# Patient Record
Sex: FEMALE | Race: WHITE
[De-identification: names, ages, dates, MRNs, and addresses within clinical notes are randomized per-mention and may not be internally consistent; named-entity substitution may affect disease eponyms.]

---

## 2018-07-31 ENCOUNTER — HOSPITAL ENCOUNTER (EMERGENCY)
Dept: HOSPITAL 45 - C.EDB | Age: 63
Discharge: HOME | End: 2018-07-31
Payer: COMMERCIAL

## 2018-07-31 ENCOUNTER — HOSPITAL ENCOUNTER (EMERGENCY)
Dept: HOSPITAL 45 - C.EDB | Age: 63
LOS: 1 days | Discharge: HOME | End: 2018-08-01
Payer: COMMERCIAL

## 2018-07-31 VITALS
HEIGHT: 62.99 IN | BODY MASS INDEX: 26.91 KG/M2 | WEIGHT: 151.9 LBS | WEIGHT: 151.9 LBS | BODY MASS INDEX: 26.91 KG/M2 | HEIGHT: 62.99 IN

## 2018-07-31 VITALS
HEIGHT: 62.99 IN | HEIGHT: 62.99 IN | WEIGHT: 152.34 LBS | WEIGHT: 152.34 LBS | BODY MASS INDEX: 26.99 KG/M2 | BODY MASS INDEX: 26.99 KG/M2

## 2018-07-31 VITALS — HEART RATE: 96 BPM | OXYGEN SATURATION: 98 % | DIASTOLIC BLOOD PRESSURE: 64 MMHG | SYSTOLIC BLOOD PRESSURE: 119 MMHG

## 2018-07-31 VITALS — DIASTOLIC BLOOD PRESSURE: 89 MMHG | HEART RATE: 70 BPM | SYSTOLIC BLOOD PRESSURE: 136 MMHG | OXYGEN SATURATION: 100 %

## 2018-07-31 VITALS — TEMPERATURE: 97.52 F

## 2018-07-31 VITALS — TEMPERATURE: 97.34 F

## 2018-07-31 DIAGNOSIS — N20.1: Primary | ICD-10-CM

## 2018-07-31 DIAGNOSIS — Z79.899: ICD-10-CM

## 2018-07-31 DIAGNOSIS — Z87.442: ICD-10-CM

## 2018-07-31 DIAGNOSIS — R11.2: ICD-10-CM

## 2018-07-31 DIAGNOSIS — R20.0: ICD-10-CM

## 2018-07-31 DIAGNOSIS — Z88.8: ICD-10-CM

## 2018-07-31 DIAGNOSIS — N13.2: Primary | ICD-10-CM

## 2018-07-31 LAB
ALBUMIN SERPL-MCNC: 3.8 GM/DL (ref 3.4–5)
ALP SERPL-CCNC: 60 U/L (ref 45–117)
ALT SERPL-CCNC: 28 U/L (ref 12–78)
AST SERPL-CCNC: 23 U/L (ref 15–37)
BASOPHILS # BLD: 0.03 K/UL (ref 0–0.2)
BASOPHILS NFR BLD: 0.4 %
BUN SERPL-MCNC: 19 MG/DL (ref 7–18)
CALCIUM SERPL-MCNC: 9.5 MG/DL (ref 8.5–10.1)
CO2 SERPL-SCNC: 27 MMOL/L (ref 21–32)
CREAT SERPL-MCNC: 0.75 MG/DL (ref 0.6–1.2)
EOS ABS #: 0.06 K/UL (ref 0–0.5)
EOSINOPHIL NFR BLD AUTO: 314 K/UL (ref 130–400)
GLUCOSE SERPL-MCNC: 99 MG/DL (ref 70–99)
HCT VFR BLD CALC: 39.3 % (ref 37–47)
HGB BLD-MCNC: 12.9 G/DL (ref 12–16)
IG#: 0.01 K/UL (ref 0–0.02)
IMM GRANULOCYTES NFR BLD AUTO: 22.2 %
LIPASE: 105 U/L (ref 73–393)
LYMPHOCYTES # BLD: 1.74 K/UL (ref 1.2–3.4)
MCH RBC QN AUTO: 29.5 PG (ref 25–34)
MCHC RBC AUTO-ENTMCNC: 32.8 G/DL (ref 32–36)
MCV RBC AUTO: 89.7 FL (ref 80–100)
MONO ABS #: 0.36 K/UL (ref 0.11–0.59)
MONOCYTES NFR BLD: 4.6 %
NEUT ABS #: 5.64 K/UL (ref 1.4–6.5)
NEUTROPHILS # BLD AUTO: 0.8 %
NEUTROPHILS NFR BLD AUTO: 71.9 %
PMV BLD AUTO: 9.9 FL (ref 7.4–10.4)
POTASSIUM SERPL-SCNC: 4 MMOL/L (ref 3.5–5.1)
PROT SERPL-MCNC: 7.6 GM/DL (ref 6.4–8.2)
RED CELL DISTRIBUTION WIDTH CV: 13.3 % (ref 11.5–14.5)
RED CELL DISTRIBUTION WIDTH SD: 43.6 FL (ref 36.4–46.3)
SODIUM SERPL-SCNC: 137 MMOL/L (ref 136–145)
WBC # BLD AUTO: 7.84 K/UL (ref 4.8–10.8)

## 2018-07-31 NOTE — EMERGENCY ROOM VISIT NOTE
History


Report prepared by Faustino:  Ar Solis


Under the Supervision of:  Dr. Michael Deleon M.D.


First contact with patient:  21:11


Chief Complaint:  KIDNEY STONE


Stated Complaint:  PAIN (KIDNEY STONE),NAUSEA,VOMITING





History of Present Illness


The patient is a 62 year old female who presents to the Emergency Room with 

complaints of waxing/waning left flank pain that began at 1800. She rates her 

pain as an 6/10 in severity. The patient states that she developed left flank 

pain today that caused her to come to the ED this morning. Per the patient's CT 

report she was found to have a 3 mm distal left ureteral calculus. Her lab work 

was found to be normal and she was discharged with an Oxycodone prescription. 

The patient states that when she was discharged her pain was not as severe. She 

states that around 1800 she started to experience a 10/10 in severity pain in 

her left flank. The patient states she did not  her Oxycodone 

prescription at that time, which caused her to take Aspirin. She reports that 

after she picked up her medication, she began to vomit. The patient states that 

she has not been able to keep her medication down. The patient denies LOC, 

headache, fevers, chills, diaphoresis, visual changes, neck pain, chest pain, 

breathing difficulties, abdominal pain, back pain, melena, hematochezia, 

urinary symptoms, weakness, lymphadenopathy, rash, or other complaints. She 

notes she did have numbness in her left arm down into her left leg over the 

weekend. The patient states these symptoms are resolved.





   Source of History:  patient


   Onset:  1800


   Position:  other (left flank)


   Symptom Intensity:  6/10


   Timing:  waxes/wanes


   Modifying Factors (Relieving):  other (Oxycodone, Aspirin)


   Associated Symptoms:  + nausea, + vomiting, + numbness (resolved)





Review of Systems


See HPI for pertinent positives and negatives.  A total of ten systems were 

reviewed and were otherwise negative.





Past Medical & Surgical


Medical Problems:


(1) Breast cancer


(2) Kidney stones


(3) No active medical problems








Family History





Patient reports no known family medical history.





Social History


Smoking Status:  Never Smoker


Marital Status:  


Housing Status:  lives with significant other


Occupation Status:  employed





Current/Historical Medications


Scheduled


Anastrozole (Arimidex), 1 MG PO DAILY


Calcium Carbonate-Vitamin D (Calcium + D), 1 TAB PO DAILY


Cyanocobalamin (Vitamin B12), 1,000 MCG PO DAILY


Fish Oil (Sula-3), 1 CAP PO DAILY





Scheduled PRN


Oxycodone Immediate Rel Tab (Roxicodone Ir), 5 MG PO Q4H PRN for Severe Pain





Allergies


Coded Allergies:  


     Iodine (Verified  Allergy, Mild, Sneezing, 7/31/18)


     Povidone Iodine (Verified  Allergy, Mild, Urticaria, 7/31/18)





Physical Exam


Vital Signs











  Date Time  Temp Pulse Resp B/P (MAP) Pulse Ox O2 Delivery O2 Flow Rate FiO2


 


7/31/18 23:50  96 18 119/64 98 Room Air  


 


7/31/18 22:41  75 18 109/57 99 Nasal Cannula 2.0 


 


7/31/18 21:47  75 18 127/67 95 Room Air  


 


7/31/18 21:02 36.3 76 20 153/81 98 Room Air  











Physical Exam


GENERAL: Awake, alert, well-appearing, in mild distress


HENT: Normocephalic, atraumatic. Oropharynx unremarkable.


EYES: Normal conjunctiva. Sclera non-icteric.


NECK: Supple. No nuchal rigidity. FROM. No masses.


RESPIRATORY: Clear to auscultation. No wheezes. No rales.  Normal respiratory 

effort.


CARDIAC: Normal rate.  Normal rhythm. No murmurs.  No rubs. Extremities warm 

and well perfused. Pulses equal.  No JVD.


GI: Soft, non-distended. No tenderness to palpation. No rebound or guarding. No 

masses.


RECTAL: Deferred.


MUSCULOSKELETAL: Atraumatic. Chest examination reveals no tenderness. The back 

is symmetrical on inspection without obvious abnormality. There is left CVA 

tenderness to palpation. No joint edema. 


LOWER EXTREMITIES: Calves are equal size bilaterally and non-tender. No edema. 

No discoloration. 


NEURO: Normal sensorium. No sensory or motor deficits noted. 


SKIN: No rash or jaundice noted.





Medical Decision & Procedures


Medications Administered











 Medications


  (Trade)  Dose


 Ordered  Sig/Juan Jose


 Route  Start Time


 Stop Time Status Last Admin


Dose Admin


 


 Sodium Chloride  1,000 ml @ 


 999 mls/hr  Q1H1M STAT


 IV  7/31/18 21:34


 7/31/18 22:34 DC 7/31/18 21:40


999 MLS/HR


 


 Ondansetron HCl


  (Zofran Inj)  4 mg  NOW  STAT


 IV  7/31/18 21:34


 7/31/18 21:37 DC 7/31/18 21:43


4 MG


 


 Ketorolac


 Tromethamine


  (Toradol Inj)  10 mg  NOW  STAT


 IV  7/31/18 21:34


 7/31/18 21:37 DC 7/31/18 21:45


10 MG


 


 Hydromorphone HCl


  (Dilaudid Inj)  0.5 mg  Q15M  PRN


 IV  7/31/18 21:45


 8/1/18 00:14 DC 7/31/18 21:46


0.5 MG











ED Course


2128: The patient was evaluated in room C04. A complete history and physical 

exam was performed.





2134: Ordered Toradol Injection 10 mg IV, Zofran Injection 4 mg IV, Sodium 

Chloride 1000 ml @ 999 mls/hr IV, Sodium Chloride 1000 ml @ 125 mls/hr IV.





2145: Ordered Dilaudid Injection 0.5 mg IV.





2308: I reevaluated the patient and she feels a lot better. She will be getting 

an oral challenge.





2348: I reevaluated the patient. Discussed results and discharge instructions: 

She verbalized understanding and agreement. The patient is ready for discharge.





Medical Decision


Prior records/ancillary studies reviewed.





Triage Nursing notes reviewed and agree them.





Additional history obtained from the family.





The patient's history was concerning for flank and abdominal pain with recent 

diagnosis of kidney stone





Differential diagnosis:


Etiologies such as renal colic, appendicitis, diverticulitis, mesenteric 

ischemia, aortic pathology, infections, inflammatory bowel disease, PUD, 

biliary pathology, UTI, as well as others were entertained.  





Physical examination findings:


As above.





ER treatment provided:


IV Toradol


IV normal saline


IV Zofran


IV Dilaudid


On reassessment the patient felt much better.





Diagnostic interpretation by me:


Deferred





It appears that the patient has isolated renal colic from a left sided stone.  

Unfortunately she had nausea and vomiting that increased before her medications 

could take hold.  She is doing much better at this point time and feels 

comfortable with discharge.





By the evaluation outlined above emergent etiologies such as appendicitis, 

diverticulitis, mesenteric ischemia, aortic pathology, infections, inflammatory 

bowel disease, PUD, biliary pathology, UTI, as well as others were deemed 

relatively unlikely.  


The patient and  were informed about the findings as listed above.  All 

questions were answered and they were pleased with the treatment.  Return 

instructions were outlined and the patient was discharged in stable condition.  





Outpatient prescription management:


Oxy IR 5mg 1-2 po Q4 hrs prn 


Zofran ODT 4 mg every 4 hours as needed for nausea


Continue Flomax as prescribed   





Referral:








The patient was referred back to her primary care physician for follow-up in 1-

2 days for a recheck of the current condition.





Medication Reconcilliation


Current Medication List:  was personally reviewed by me





Blood Pressure Screening


Patient's blood pressure:  Elevated blood pressure


Blood pressure disposition:  Elevated BP felt to be situational





Impression





 Primary Impression:  


 Ureterolithiasis


 Additional Impressions:  


 Vomiting


 Flank pain





Scribe Attestation


The scribe's documentation has been prepared under my direction and personally 

reviewed by me in its entirety. I confirm that the note above accurately 

reflects all work, treatment, procedures, and medical decision making performed 

by me.





Departure Information


Dispostion


Home / Self-Care





Referrals


No Doctor, Assigned (PCP)








Carmenza SimmonsD.O.





Forms


HOME CARE DOCUMENTATION FORM,                                                 

               IMPORTANT VISIT INFORMATION





Patient Instructions


[facility]





Additional Instructions





KIDNEY STONE INSTRUCTIONS:





Oxycodone Immediate Release (OxyIR) 5mg: Take 1-2 pills every four hours for 

pain.  Avoid alcohol, operating machinery or dangerous equipment, working on 

ladders or roofs, DRIVING, or situations where being under the influence may be 

dangerous.  It is recommended to use an over-the-counter stool softener such as 

Colace, 100mg twice daily while taking this medication to avoid constipation.





Zofran 4 mg oral dissolving tablets: take one tablet and allow it to melt in 

your mouth every 4 hours as needed for nausea.





Continue your Flomax.





Ibuprofen(Motrin, Advil) may be used for fever or pain.  Use 600mg every six 

hours as needed.  Take with food.  Avoid using more than 2400mg in a 24 hour 

period.  Do not use 2400mg per day for more than three consecutive days without 

physician direction.  Prolonged inappropriate use can lead to stomach upset or 

ulcers. This medication can be taken if you need to drive, work, or perform 

activities which may be dangerous when taking narcotic pain medication.


(AND/OR)


Acetaminophen(Tylenol) may be used for fever or pain.  Use 1000mg every six 

hours as needed.  Avoid using more than 4000mg in a 24 hour period.  This 

medication can be taken if you need to drive, work, or perform activities which 

may be dangerous when taking narcotic pain medication.





Strain your urine and collect the stone for your primary doctor.





Rest and avoid strenuous activity until your stone passes and symptoms resolve.


Drink plenty of fluids.





Return to the ER for worsening abdominal or back pain, vomiting, fevers, 

passing out, or as needed.





Follow-up with your primary office tomorrow.





Problem Qualifiers

## 2018-07-31 NOTE — PHARMACY PROGRESS NOTE
ED Pharmacist Progress Note


Date of Service:


Jul 31, 2018.


Patient called stating she did not get an Rx for Flomax however she was 

expecting to receive one.





Spoke w/ Dr Cowan and he gave auth to call in Rx for Flomax 0.4mg PO daily x 

10 days, no refills to pharmacy of patient's choice.





Pt requested Rx be called to [facility] [phone], which I did do for her.

## 2018-07-31 NOTE — DIAGNOSTIC IMAGING REPORT
CT SCAN OF THE ABDOMEN AND PELVIS WITHOUT CONTRAST



CLINICAL HISTORY: Left mid abdominal pain. History of some diarrhea.    



COMPARISON STUDY:  No previous studies for comparison. 



TECHNIQUE: CT scan of the abdomen and pelvis was performed from the lung bases

to the proximal femurs. Images are reviewed in the axial, sagittal, and coronal

planes. IV contrast was not administered for this examination.  A dose lowering

technique was utilized adhering to the principles of ALARA.





CT DOSE: 788.14 mGycm

FINDINGS:



Lower chest: The heart is normal in size and configuration, without pericardial

effusion. The lung bases and pleural spaces are clear.



Liver: The unenhanced liver is normal in size, contour, and attenuation. There

is no intrahepatic biliary ductal dilatation.



Gallbladder: Unremarkable.



Spleen: Normal in size and attenuation.



Pancreas: Unremarkable.



Adrenal glands: Unremarkable.



Kidneys: There is a punctate nonobstructing right renal calculus. There is mild

left-sided hydronephrosis and hydroureter. There is a 3 mm distal left ureteral

calculus.



Bowel: There are no transition zones indicate bowel obstruction. There is no

acute diverticulitis. There are no findings to indicate acute appendicitis.



Peritoneum: There is no intraperitoneal free air or abdominal ascites.



Vasculature: The abdominal aorta is normal in course and caliber.



Adenopathy: None.



Pelvic viscera: There is a calcified uterine fibroid.



Skeletal structures: No destructive osseous lesions are seen.



IMPRESSION:  

1. Punctate nonobstructing right renal calculus

2. 3 mm distal left ureteral calculus with secondary mild obstructive changes

3. No evidence of bowel obstruction. No evidence of free air

4. No evidence of acute appendicitis. No evidence of acute diverticulitis







Electronically signed by:  Gregorio Lane M.D.

7/31/2018 1:18 PM



Dictated Date/Time:  7/31/2018 1:13 PM

## 2018-07-31 NOTE — EMERGENCY ROOM VISIT NOTE
History


Report prepared by Faustino:  Erica Bales


Under the Supervision of:  Dr. Michael Cowan D.O.


First contact with patient:  11:48


Chief Complaint:  ABDOMINAL PAIN


Stated Complaint:  LEFT LOWER QUADRANT PAIN


Nursing Triage Summary:  


triage note;





pt reports left lower abd pain since this am.





pt reports some diarrhea.





History of Present Illness


The patient is a 62 year old female who presents to the Emergency Room with 

complaints of severe left abdominal pain beginning this morning. She rates her 

pain at a 3/10 currently but states that it was at a 6/7 out of 10 when she 

came to the ED. She reports that she was bent over in pain a half hour prior to 

arrival. The patient states that her pain began on her way to work. She states 

that she was able to go to the bathroom a little but reports that it did not 

alleviate her symptoms. She reports that her bowel movement today was loose and 

states that she is normally regular.  The patient states that standing 

alleviates her pain and that bending over and sitting exacerbates her pain. She 

denies having any nausea, vomiting, or urinary symptoms. The patient states 

that she recently ate blueberries and that she normally does not eat them. She 

denies a history of diverticulitis, diverticulosis, or kidney stones. The 

patient also denies a history of an appendectomy or a cholecystectomy.





   Source of History:  patient


   Onset:  this morning


   Position:  abdomen (left)


   Symptom Intensity:  severe


   Quality:  other (pain)


   Modifying Factors (Worsening):  other (bending over, sitting )


   Modifying Factors (Relieving):  other (standing)


   Associated Symptoms:  No nausea, No vomiting, No urinary symptoms





Review of Systems


See HPI for pertinent positives & negatives. A total of 10 systems reviewed and 

were otherwise negative.





Past Medical & Surgical


Medical Problems:


(1) No active medical problems








Family History





Patient reports no known family medical history.





Social History


Smoking Status:  Never Smoker


Marital Status:  


Housing Status:  lives with significant other





Current/Historical Medications


Scheduled


Anastrozole (Arimidex), 1 MG PO DAILY


Calcium Carbonate-Vitamin D (Calcium + D), 1 TAB PO DAILY


Cyanocobalamin (Vitamin B12), 1,000 MCG PO DAILY


Fish Oil ([place]-3), 1 CAP PO DAILY





Scheduled PRN


Oxycodone Immediate Rel Tab (Roxicodone Ir), 5 MG PO Q4H PRN for Severe Pain





Allergies


Coded Allergies:  


     Iodine (Verified  Allergy, Mild, Sneezing, 7/31/18)


     Povidone Iodine (Verified  Allergy, Mild, Urticaria, 7/31/18)





Physical Exam


Vital Signs











  Date Time  Temp Pulse Resp B/P (MAP) Pulse Ox O2 Delivery O2 Flow Rate FiO2


 


7/31/18 13:54  70 16 136/89 100 Room Air  


 


7/31/18 11:37 36.4 75 18 166/83 99 Room Air  











Physical Exam


GENERAL: Standing in the room, alert, well appearing, well nourished, no 

distress, non-toxic 


EYE EXAM: normal conjunctiva. 


OROPHARYNX: no exudate, no erythema, lips, buccal mucosa, and tongue normal and 

mucous membranes are moist


NECK: supple, no nuchal rigidity, no adenopathy, non-tender


LUNGS: Clear to auscultation. Normal chest wall mechanics


HEART: no murmurs, S1 normal and S2 normal 


ABDOMEN: abdomen soft, tenderness to palpation in the left mid abdomen, normo-

active bowel sounds, no masses, no rebound or guarding. 


BACK: Back is symmetrical on inspection and there is no deformity, no midline 

tenderness, no CVA tenderness. 


SKIN: no rashes and no bruising 


UPPER EXTREMITIES: upper extremities are grossly normal. 


LOWER EXTREMITIES: No pitting edema. 


NEURO EXAM: Normal sensorium, cranial nerves II-XII grossly intact, normal 

speech,  no gross weakness of arms, no gross weakness of legs.





Medical Decision & Procedures


ER Provider


Diagnostic Interpretation:


Radiology results as stated below per my review and the radiologist's 

interpretation: 





CT SCAN OF THE ABDOMEN AND PELVIS WITHOUT CONTRAST





CLINICAL HISTORY: Left mid abdominal pain. History of some diarrhea.    





COMPARISON STUDY:  No previous studies for comparison. 





TECHNIQUE: CT scan of the abdomen and pelvis was performed from the lung bases


to the proximal femurs. Images are reviewed in the axial, sagittal, and coronal


planes. IV contrast was not administered for this examination.  A dose lowering


technique was utilized adhering to the principles of ALARA.








CT DOSE: 788.14 mGycm


FINDINGS:





Lower chest: The heart is normal in size and configuration, without pericardial


effusion. The lung bases and pleural spaces are clear.





Liver: The unenhanced liver is normal in size, contour, and attenuation. There


is no intrahepatic biliary ductal dilatation.





Gallbladder: Unremarkable.





Spleen: Normal in size and attenuation.





Pancreas: Unremarkable.





Adrenal glands: Unremarkable.





Kidneys: There is a punctate nonobstructing right renal calculus. There is mild


left-sided hydronephrosis and hydroureter. There is a 3 mm distal left ureteral


calculus.





Bowel: There are no transition zones indicate bowel obstruction. There is no


acute diverticulitis. There are no findings to indicate acute appendicitis.





Peritoneum: There is no intraperitoneal free air or abdominal ascites.





Vasculature: The abdominal aorta is normal in course and caliber.





Adenopathy: None.





Pelvic viscera: There is a calcified uterine fibroid.





Skeletal structures: No destructive osseous lesions are seen.





IMPRESSION:  


1. Punctate nonobstructing right renal calculus


2. 3 mm distal left ureteral calculus with secondary mild obstructive changes


3. No evidence of bowel obstruction. No evidence of free air


4. No evidence of acute appendicitis. No evidence of acute diverticulitis











Electronically signed by:  Gregorio Lane M.D.


7/31/2018 1:18 PM





Dictated Date/Time:  7/31/2018 1:13 PM





Laboratory Results


7/31/18 12:35








Red Blood Count 4.38, Mean Corpuscular Volume 89.7, Mean Corpuscular Hemoglobin 

29.5, Mean Corpuscular Hemoglobin Concent 32.8, Mean Platelet Volume 9.9, 

Neutrophils (%) (Auto) 71.9, Lymphocytes (%) (Auto) 22.2, Monocytes (%) (Auto) 

4.6, Eosinophils (%) (Auto) 0.8, Basophils (%) (Auto) 0.4, Neutrophils # (Auto) 

5.64, Lymphocytes # (Auto) 1.74, Monocytes # (Auto) 0.36, Eosinophils # (Auto) 

0.06, Basophils # (Auto) 0.03





7/31/18 12:35

















Test


  7/31/18


12:05 7/31/18


12:35


 


Urine Color YELLOW  


 


Urine Appearance TURBID (CLEAR)  


 


Urine pH


  >= 9.0


(4.5-7.5) 


 


 


Urine Specific Gravity


  1.018


(1.000-1.030) 


 


 


Urine Protein NEG (NEG)  


 


Urine Glucose (UA) NEG (NEG)  


 


Urine Ketones NEG (NEG)  


 


Urine Occult Blood 3+ (NEG)  


 


Urine Nitrite NEG (NEG)  


 


Urine Bilirubin NEG (NEG)  


 


Urine Urobilinogen NEG (NEG)  


 


Urine Leukocyte Esterase NEG (NEG)  


 


Urine WBC (Auto) 1-5 /hpf (0-5)  


 


Urine RBC (Auto) >30 /hpf (0-4)  


 


Urine Hyaline Casts (Auto) 0 /lpf (0-5)  


 


Urine Epithelial Cells (Auto)


  5-10 /lpf


(0-5) 


 


 


Urine Bacteria (Auto) NEG (NEG)  


 


Urine Pregnancy Test NEG (NEG)  


 


White Blood Count


  


  7.84 K/uL


(4.8-10.8)


 


Red Blood Count


  


  4.38 M/uL


(4.2-5.4)


 


Hemoglobin


  


  12.9 g/dL


(12.0-16.0)


 


Hematocrit  39.3 % (37-47) 


 


Mean Corpuscular Volume


  


  89.7 fL


()


 


Mean Corpuscular Hemoglobin


  


  29.5 pg


(25-34)


 


Mean Corpuscular Hemoglobin


Concent 


  32.8 g/dl


(32-36)


 


Platelet Count


  


  314 K/uL


(130-400)


 


Mean Platelet Volume


  


  9.9 fL


(7.4-10.4)


 


Neutrophils (%) (Auto)  71.9 % 


 


Lymphocytes (%) (Auto)  22.2 % 


 


Monocytes (%) (Auto)  4.6 % 


 


Eosinophils (%) (Auto)  0.8 % 


 


Basophils (%) (Auto)  0.4 % 


 


Neutrophils # (Auto)


  


  5.64 K/uL


(1.4-6.5)


 


Lymphocytes # (Auto)


  


  1.74 K/uL


(1.2-3.4)


 


Monocytes # (Auto)


  


  0.36 K/uL


(0.11-0.59)


 


Eosinophils # (Auto)


  


  0.06 K/uL


(0-0.5)


 


Basophils # (Auto)


  


  0.03 K/uL


(0-0.2)


 


RDW Standard Deviation


  


  43.6 fL


(36.4-46.3)


 


RDW Coefficient of Variation


  


  13.3 %


(11.5-14.5)


 


Immature Granulocyte % (Auto)  0.1 % 


 


Immature Granulocyte # (Auto)


  


  0.01 K/uL


(0.00-0.02)


 


Anion Gap


  


  6.0 mmol/L


(3-11)


 


Est Creatinine Clear Calc


Drug Dose 


  74.3 ml/min 


 


 


Estimated GFR (


American) 


  100.4 


 


 


Estimated GFR (Non-


American 


  86.6 


 


 


BUN/Creatinine Ratio  25.0 (10-20) 


 


Calcium Level


  


  9.5 mg/dl


(8.5-10.1)


 


Total Bilirubin


  


  0.4 mg/dl


(0.2-1)


 


Direct Bilirubin


  


  0.1 mg/dl


(0-0.2)


 


Aspartate Amino Transf


(AST/SGOT) 


  23 U/L (15-37) 


 


 


Alanine Aminotransferase


(ALT/SGPT) 


  28 U/L (12-78) 


 


 


Alkaline Phosphatase


  


  60 U/L


()


 


Total Protein


  


  7.6 gm/dl


(6.4-8.2)


 


Albumin


  


  3.8 gm/dl


(3.4-5.0)


 


Lipase


  


  105 U/L


()





Laboratory results per my review.





Medications Administered











 Medications


  (Trade)  Dose


 Ordered  Sig/Juan Jose


 Route  Start Time


 Stop Time Status Last Admin


Dose Admin


 


 Sodium Chloride  1,000 ml @ 


 999 mls/hr  Q1H1M STAT


 IV  7/31/18 12:02


 7/31/18 13:02 DC 7/31/18 12:02


999 MLS/HR


 


 Ketorolac


 Tromethamine


  (Toradol Inj)  30 mg  STK-MED ONCE


 .ROUTE  7/31/18 12:37


 7/31/18 12:38 DC 7/31/18 12:37


30 MG











ED Course


ED COURSE: 


Vital signs were reviewed and showed hypertension.


The patients medical record was reviewed


The above diagnostic studies were performed and reviewed.


ED treatments and interventions as stated above. 





1156: The patient was evaluated in room B12B. A complete history and physical 

examination was performed.





1202: Ordered Sodium Chloride 1000 ml @ 999 mls/hr IV.





1208: I updated the patient. 





1237: Ordered Toradol Inj 30 mg IV. 





1350: Upon reevaluation, the patient is feeling better. I discussed the 

findings and the treatment plan with the patient.  She verbalizes agreement and 

understanding.  She was discharged home.





Medical Decision


Differential diagnoses includes but is not limited to gastritis, peptic ulcer 

disease, GERD, gallbladder disease, pancreatitis, small bowel obstruction, 

acute coronary syndrome, pericarditis, ischemic bowel, irritable bowel disease, 

irritable bowel syndrome, appendicitis, diverticulitis, malignancy, hernia, 

urinary tract infection, torsion, [pregnancy/ectopic pregnancy (if female)], 

perforation, trauma, infectious. 





Patient is a 62-year-old female who presents the ER for left lower quadrant 

abdominal pain which is been present for the past 24 hours.  Sitting up makes 

the pain better.  Sitting makes it significantly worse.  CBC along with BMP, 

LFTs, bilirubin lipase is unremarkable.  UA without infection.  Pregnancy was 

negative.  UA did have hematuria.  CT shows 3 mm left distal ureteral stone.  

Mild hydro-.  Patient was given Toradol.  Pain was controlled.  She is updated 

at bedside.  Discharge follow-up with PCP as an outpatient. PDMP reviewed. 

Discussed with Pt concerning signs and symptoms to watch out for. Pt was 

instructed to follow up with their PCP and discussed with the patient their 

option to return to the ED at anytime for persistent or worsening symptoms. The 

appropriate anticipatory guidance and out-patient management, including 

indications for return to the emergency department, were explained at length to 

the patient and understood.





[place] Drug Monitoring Program


Search Results:  patient reviewed within database, no issues identified





Medication Reconcilliation


Current Medication List:  was personally reviewed by me





Blood Pressure Screening


Patient's blood pressure:  Elevated blood pressure


Blood pressure disposition:  Elevated BP felt to be situational





Impression





 Primary Impression:  


 Renal colic





Scribe Attestation


The scribe's documentation has been prepared under my direction and personally 

reviewed by me in its entirety. I confirm that the note above accurately 

reflects all work, treatment, procedures, and medical decision making performed 

by me.





Departure Information


Dispostion


Home / Self-Care





Prescriptions





Oxycodone Immediate Rel Tab (ROXICODONE IR) 5 Mg Tab


5 MG PO Q4H Y for Severe Pain, #15 TAB


   Prov: Michael Cowan,          7/31/18





Referrals


Carmenza Simmons D.O. (PCP)








Stone Gross M.D.





Forms


Call Back Authorization, HOME CARE DOCUMENTATION FORM,                         

                                       IMPORTANT VISIT INFORMATION





Patient Instructions


ED [facility] Renal W Colic, My [facility]





Additional Instructions





Please follow up with your primary care doctor with in the next 24 hours.  Any 

worsening of your symptoms, please return to the ED immediately. This includes 

any fevers greater than 100.4, worsening pain, chest pain, shortness breath, 

persistent nausea, vomiting, unable to eat or drink, or any other concerning 

signs or symptoms from your standpoint.





You were given medications during this visit that will inhibit your ability to 

drive, operate machinery and work. Please do NOT drive, operate machinery or 

work for the next 12hrs. You were also given a prescription for a narcotic. 

While taking this medication you should also not drive, operate machinery and 

or work.





She can take Motrin 200-400 mg 3 times a day.





Please make sure that you follow-up with your PCP and urology as listed below.